# Patient Record
Sex: FEMALE | Race: AMERICAN INDIAN OR ALASKA NATIVE
[De-identification: names, ages, dates, MRNs, and addresses within clinical notes are randomized per-mention and may not be internally consistent; named-entity substitution may affect disease eponyms.]

---

## 2017-03-17 ENCOUNTER — HOSPITAL ENCOUNTER (INPATIENT)
Dept: HOSPITAL 14 - H.OPSURG | Age: 57
LOS: 3 days | Discharge: NURSING, REHAB OR LONG-TERM CARE | DRG: 465 | End: 2017-03-20
Attending: FAMILY MEDICINE | Admitting: FAMILY MEDICINE
Payer: MEDICARE

## 2017-03-17 VITALS — BODY MASS INDEX: 27.1 KG/M2

## 2017-03-17 DIAGNOSIS — I10: ICD-10-CM

## 2017-03-17 DIAGNOSIS — F17.210: ICD-10-CM

## 2017-03-17 DIAGNOSIS — M19.072: Primary | ICD-10-CM

## 2017-03-17 DIAGNOSIS — M21.42: ICD-10-CM

## 2017-03-17 DIAGNOSIS — Z23: ICD-10-CM

## 2017-03-17 DIAGNOSIS — L98.9: ICD-10-CM

## 2017-03-17 DIAGNOSIS — E11.65: ICD-10-CM

## 2017-03-17 DIAGNOSIS — Z79.4: ICD-10-CM

## 2017-03-17 PROCEDURE — 0HBNXZZ EXCISION OF LEFT FOOT SKIN, EXTERNAL APPROACH: ICD-10-PCS

## 2017-03-17 PROCEDURE — 0SGG04Z FUSION OF LEFT ANKLE JOINT WITH INTERNAL FIXATION DEVICE, OPEN APPROACH: ICD-10-PCS

## 2017-03-17 PROCEDURE — 3E0234Z INTRODUCTION OF SERUM, TOXOID AND VACCINE INTO MUSCLE, PERCUTANEOUS APPROACH: ICD-10-PCS | Performed by: FAMILY MEDICINE

## 2017-03-17 RX ADMIN — ENOXAPARIN SODIUM SCH MG: 40 INJECTION SUBCUTANEOUS at 18:10

## 2017-03-17 RX ADMIN — OXYCODONE HYDROCHLORIDE AND ACETAMINOPHEN PRN TAB: 5; 325 TABLET ORAL at 20:14

## 2017-03-17 NOTE — CP.PCM.PN
Subjective





- Date & Time of Evaluation


Date of Evaluation: 03/17/17


Time of Evaluation: 06:00





- Subjective


Subjective: 


56 year old female patient with PMHx of DM and HTN was seen at bedside SDS this 

morning for scheduled surgery of Left foot Talonavicular joint arthrodesis with 

Dr. Hector Bradley DPM. Patient states that she had pain to medial arch of the 

bilateral feet for more than 5 years, Left more than Right. Patient states that 

she does not remember experiencing any trauma to bilateral feet that might have 

caused the pain. She states that she can only walk very slow because of the 

pain which gets worse as she walks. Patient states that she has tried 

conservational treatments including orthotics and injections and now wishes for 

surgical intervention. Patient confirms NPO status since midnight yesterday and 

agrees with the surgical plan for fusion the TN joint of Left foot today with 

Dr. Bradley





Objective





- Vital Signs/Intake and Output


Vital Signs (last 24 hours): 


 











Temp Pulse Resp BP Pulse Ox


 


 98.4 F   85   18   119/75    


 


 03/07/17 13:56  03/07/17 13:56  03/07/17 13:56  03/07/17 13:56   











- Constitutional


Appears: Well, Non-toxic, No Acute Distress





- Extremities Exam


Additional comments: 


Bilateral lower extremities exam





DERM: No open wound is noted. No erythema is noted to bilateral feet. No sign 

of acute infection is noted.





VASC: Palpable DP and PT noted bilaterally 1/4, CRT less than 3 seconds noted 

to all digits bilaterally.





NEURO: Protective sensation intact





ORTHO including biomechanics exam:





Biomechanics evaluation for AlexanderMichael Glaser





S:


56 year old female patient presents with painful Left mid foot mostly at the 

level of Talonavicular joint and ankle for more than 5 years. Patient have 

tried conservative treatments including orthotic inserts which have failed to 

alleviate the persisting pain to left foot and ankle. The pain to left ankle is 

now worse than it has been in the past, limiting her walking ability to no more 

than 5 minutes.





O:


Pain on palpation to mid foot is noted with pain exacerbated upon weightbearing 

to Left foot. Limited ROM to Left ankle is noted with less than 15' in DF, 

accompanied by slight pain.


Arch collapse is noted bilaterally upon weightbearing. RCSP 8 degrees everted 

to Left, 8 degrees to Right. No structural or functional Limb length 

discrepancy is noted upon examination. STJ ROM is adaquate with MPJ inverted to 

the rearfoot. Anterior cavus foot type is not present to Left foot.


Hypermobile 1st ray is noted to Left foot.





Gait evaluation reveals apropulsive gait with weak plantarflexion before swing 

phase of gait cycle. Excessive pronation is noted throughout midstance and 

propulsion, with the knees flexed throughout the gait cycles. Increased angle 

of gait from the normal value of 10' as well as increased base of gait. 

Enhanced arch collapse on forefoot loading is noted; Left more than right. 

Slight antalgic gait with shortened stance phase is noted to Left lower 

extremity.





A:


Painful Left foot TN joint arthritis is present. Functional pes planus without 

pain is also present to Left foot with arch collapse upon weightbearing.~





P:


Possible conservative measures were discussed with the patient including ankle 

brace, orthotics and injections, which according to the patient, have failed to 

provide adequate pain relief to Left foot. Patient is present today for 

Surgical intervention for Left talonavicular joint fusion to eliminate pain 

upon ROM secondary to arthritis by Dr. Bradley








- Neurological Exam


Neurological Exam: Alert, Awake, Oriented x3





- Psychiatric Exam


Psychiatric exam: Normal Affect, Normal Mood





- Skin


Skin Exam: Normal Color





Assessment and Plan





- Assessment and Plan (Free Text)


Assessment: 


56 year old female patient presents with arthritis to Left Talonavicular joint


Plan: 


Pt was seen and examined in SDS


Pt NPO status was confirmed


All Pre-op testing and clearance was in the chart


Pt has exhausted all conservative treatment at this time and is opting for 

surgical intervention


Pt was explained procedure and post-operative course


All pt's questions were answered to satisfaction


No guarantees were made


Pt understands all risks, benefits and complications of procedure


Pt will follow-up with Dr. Bradley

## 2017-03-17 NOTE — CP.PCM.HP
History of Present Illness





- History of Present Illness


History of Present Illness: 


56 year old female with a history of DM II and HTN. Status post Left foot TNJ 

arthrodesis and excisional removal of skin lesion. Post-Op Day#0. Complains of 

mild pain and surgical site and mild headache. No chest pain shortness of breath

, palpitations, cough, sputum. No changes in vision. No new focal deficits. 

Afebrile. 





Present on Admission





- Present on Admission


Any Indicators Present on Admission: No





Review of Systems





- Review of Systems


Review of Systems: 


As per HPI.





Past Patient History





- Past Medical History & Family History


Past Medical History?: Yes





- Past Social History


Smoking Status: Heavy Smoker > 10 Cigarettes Daily





- CARDIAC


Hx Cardiac Disorders: Yes


Hx Hypercholesterolemia: Yes


Hx Hypertension: Yes





- PULMONARY


Hx Respiratory Disorders: Yes


Hx Asthma: Yes





- NEUROLOGICAL


Hx Neurological Disorder: No





- HEENT


Hx HEENT Problems: No





- RENAL


Hx Chronic Kidney Disease: No





- ENDOCRINE/METABOLIC


Hx Endocrine Disorders: Yes


Hx Diabetes Mellitus Type 1: Yes


Hx Diabetes Mellitus Type 2: Yes


Other/Comment: thyroid nodulesGOITER





- HEMATOLOGICAL/ONCOLOGICAL


Hx Blood Disorders: No


Hx Blood Transfusions: No





- INTEGUMENTARY


Hx Dermatological Problems: No





- MUSCULOSKELETAL/RHEUMATOLOGICAL


Hx Musculoskeletal Disorders: Yes


Hx Arthritis: Yes (KNEES)


Hx Falls: No





- GASTROINTESTINAL


Hx Gastrointestinal Disorders: No





- GENITOURINARY/GYNECOLOGICAL


Hx Genitourinary Disorders: No





- PSYCHIATRIC


Hx Psychophysiologic Disorder: No





- SURGICAL HISTORY


Hx Surgeries: Yes


Hx Hysterectomy: Yes (PARTIAL)


Hx Musculoskeletal Surgery: Yes (RIGHT CARPAL TUNNEL RELEASE 11/05/14 & STEROID 

INJ. RIGHT THUMB&LONG FINGER)


Hx Orthopedic Surgery: Yes (R shoulder rotator cuff, R foot sx)


Other/Comment: thyroid bx; LEFT BREAST BX.;





- ANESTHESIA


Hx Anesthesia: Yes


Hx Anesthesia Reactions: No


Hx Malignant Hyperthermia: No


Has any member of the family had a problem w/ anesthesia?: No





Meds


Allergies/Adverse Reactions: 


 Allergies











Allergy/AdvReac Type Severity Reaction Status Date / Time


 


No Known Allergies Allergy   Verified 03/17/17 06:19














Physical Exam





- Head Exam


Head Exam: ATRAUMATIC, NORMOCEPHALIC





- Eye Exam


Eye Exam: Normal appearance





- ENT Exam


ENT Exam: Mucous Membranes Dry





- Respiratory Exam


Respiratory Exam: NORMAL BREATHING PATTERN.  absent: Rales, Rhonchi, Wheezes





- Cardiovascular Exam


Cardiovascular Exam: REGULAR RHYTHM, +S1, +S2





- GI/Abdominal Exam


GI & Abdominal Exam: Soft.  absent: Distended, Tenderness





- Extremities Exam


Extremities exam: Negative for: pedal edema


Additional comments: 


Left LE CDI


Ice pack on foot





- Neurological Exam


Neurological exam: Alert, CN II-XII Intact, Oriented x3





Results





- Vital Signs


Recent Vital Signs: 





 Last Vital Signs











Temp  98.7 F   03/17/17 15:59


 


Pulse  108 H  03/17/17 15:59


 


Resp  20   03/17/17 15:59


 


BP  158/75 H  03/17/17 15:59


 


Pulse Ox  96   03/17/17 15:59














- Labs


Labs: 





 Laboratory Results - last 24 hr











  03/17/17 03/17/17





  06:46 12:07


 


POC Glucose (mg/dL)  191 H  167 H














Assessment & Plan





- Assessment and Plan (Free Text)


Assessment: 


56 year old female with a history of HTN and DM II. Status post Left foot TNJ 

arthrodesis and excisional removal of skin lesion.


Plan: 


Home medications verified via [facility], [place], [place]





1. Left foot TNJ arthrodesis and excisional removal of skin lesion


POD#0


a. Podiatry on board


b. Management of pain





2. HTN


a. Lisinopril 10mg PO Daily





3. DM II, uncontrolled, Insulin dependent


a. SHO doe AccuChecks 


c. Hypoglycemia protocol





4. DVT Px


Lovenox 40mg SC

## 2017-03-17 NOTE — RAD
PROCEDURE:  Intraoperative fluoroscopy



HISTORY:

LEFT FOOT



COMPARISON:

Not of a



TECHNIQUE:

Intraoperative fluoroscopy was provided. Total time of fluoroscopy is 

33.1 seconds.



FINDINGS:

Thirteen fluoroscopic spot films are submitted demonstrating 

insertion of hardware fixing the anterior talus and navicular. Films 

are on file for review. 



IMPRESSION:

Fluoroscopy provided.

## 2017-03-17 NOTE — CP.SDSHP
Same Day Surgery H & P





- History


Proposed Procedure: Left foot TNJ arthrodesis and excisional removel of skin 

lesion


Pre-Op Diagnosis: Left foot painful osteoarthritis of Talonavicular joint with 

painful skin lesion





- Previous Medical/Surgical History


Pain: 4.Moderate Pain


Previous Surgical History: Right foot Bunionectoy





- Allergies


Allergies: 


Allergies





No Known Allergies Allergy (Verified 03/17/17 06:19)


 











- Physical Exam


Mental Status: Alert & Oriented x3


Neuro: WNL


Heart: WNL


Lungs: WNL


GI: WNL





- {Optional Preform as Required}


Breast: Other


Abdomen: Other


Rectal: Other


Integument: Other


GYN: Other


: Other


Ortho: Other


ENT: Other





- Impression


Impression: Pt was seen and examined in SDS.  Pt NPO status was confirmed.  All 

Pre-op testing and clearance was in the chart.  Pt has exhausted all 

conservative treatment at this time and is opting for surgical intervention.  

Pt was explained procedure and post-operative course.  All pt's questions were 

answered to satisfaction.  No guarantees were made.  Pt understands all risks, 

benefits and complications of procedure.  Pt will follow-up with Dr. Bradley


Pt. Evaluated Today:Candidate for Anesthesia & Procedure: Yes





- Date & Time


Date: 03/17/17





Short Stay Discharge





- Short Stay Discharge


Admitting Diagnosis/Reason for Visit: TALONAVICULAR ARTHRODESIS LT FOOT


Disposition: TRANS TO OBS


Referrals: 


Mar Marshall MD [Primary Care Provider] - 


Follow-up: 


Follow up with Dr. Bradley at his office as outpatient


Instructions:  RICE Therapy (GEN), Acetaminophen/Codeine (By mouth), Cephalexin 

(By mouth)


Additional Instructions (Diet, Activity): 


Non-weightbearing to Left foot


Progress Note/Discharge Note with Instructions: 


Patient was evaluated post operatively and the patient will be admitted for 24 

observational watch.


Patient will be seen by Dr. Hector Bradley DPM tomorrow morning.

## 2017-03-17 NOTE — OP
PROCEDURE DATE: 03/17/2017



SURGEON:  Hector Bradley DPM



ASSISTANT:  Jeferson Lawton DPM, PGY-3



ANESTHETIST:  Dr. Tia MD



PREOPERATIVE DIAGNOSES:

1.  Left Foot - Painful osteoarthritis of the talonavicular joint.

2.  Left Foot - Painful skin lesion of unknown etiology.



POSTOPERATIVE DIAGNOSES:

1.  Left Foot - Painful osteoarthritis of the talonavicular joint.

2.  Left Foot - Painful skin lesion of unknown etiology.



NAME OF PROCEDURES:

1.  Left Foot - Talonavicular joint arthrodesis.

2.  Left Foot - Excision of soft tissue mass of unknown etiology.



INDICATIONS:  This is a 56-year-old female with the aforementioned diagnoses.  
The patient at this time has exhausted all of her conservative treatment 
options and she now opts and requests for surgical intervention.  The patient 
signed the consent form after careful explanation of all the risks, benefits, 
complications, and alternatives to the surgical procedure.  There were no 
guarantees that were made, given, nor implied.



PREPARATION:  The patient was brought into the operating room, placed on the 
operating table in a supine position.  A well-padded pneumatic ankle tourniquet 
was placed on the patient's left ankle in the supramalleolar position.  After 
induction of general anesthesia, the left foot and ankle were then prepped and 
draped in the usual sterile manner.  A timeout was performed.  An Esmarch 
bandage was utilized to exsanguinate the left foot and ankle.  The pneumatic 
ankle tourniquet was then inflated to 250 mmHg and then the procedure began.



PROCEDURE #1:  

Attention directed to the dorsal medial aspect of the patient's left foot, 
where an approximately 5 cm curvilinear incision was made overlying the medial 
aspect of the talonavicular joint.  The incision extended from approximately 
the tip of the medial malleolus to the navicular-medial cuneiform joint.  The 
incision was deepened through the subcutaneous tissues utilizing a combination 
of sharp and blunt dissection.  Care was taken to identify and retract all 
vital neurovascular structures and cauterize all bleeders as necessary, 
including, but not limited to, the great saphenous vein and branches of the 
peroneal nerve.  The incision was deepened down to the level of the 
talonavicular joint capsule, which once encountered, a fresh #15 blade was 
utilized to make a linear capsulotomy in parallel with the skin incision, 
overlying the medial aspect of the talonavicular joint.  The periosteal and 
capsular structures were then carefully dissected free from their osseous 
attachments from the head of the talus and the base of the navicular.  Next, in 
order to gain adequate exposure to the articular surfaces of the talonavicular 
joint, a SenionLabermann distractor with two 0.062 inch K-wires was utilized to 
spread open and distract the talonavicular joint.  After that was accomplished, 
a small laminar  without sharp teeth was inserted into the 
talonavicular joint space as well and utilized to further distract the joint.  
Next, with the use of curettes and curved osteotomes, the articular surfaces 
and subchondral bone of the head of the talus and the base/posterior aspect of 
the navicular were resected in their entirety.  All articular surfaces of the 
talonavicular joint were resected and removed from the surgical field.  Next, 
the surgical site was then irrigated with a copious amount of normal sterile 
saline solution and the surgical field was now inspected and any remaining 
articular cartilage was resected as necessary.  Next, utilizing a 1.5 mm drill 
bit, the subchondral plate of the head of the talus and the posterior aspect of 
the navicular were subchondrally drilled.  Multiple perforations of the 
subchondral plate occurred on both the head of the talus and the posterior 
aspect of the navicular.  After that was completed, the talonavicular joint was 
again irrigated with a copious amount of normal sterile saline.  Next, 
utilizing a K-wire from the Synthes 4.0 cannulated cancellous screw set, the 
talonavicular joint was temporarily fixated.  While fixating the talonavicular 
joint, special attention was paid to hold the rearfoot in neither varus nor 
valgus and to hold the forefoot in a neutral position.  After the talonavicular 
joint was temporarily fixated, adequate positioning of the talonavicular joint 
surfaces was then confirmed with the use of intraoperative fluoroscopy.  Next, 
an additional K-wire from the aforementioned screw set was now inserted further 
lateral and still parallel to the first K-wire.  Again, temporary fixation was 
confirmed with the use of intraoperative fluoroscopy and adjusted as deemed 
necessary in order to gain adequate bony apposition.  Next, following standard 
AO principles and techniques, a Synthes 4.0 mm x 30 mm cannulated cancellous 
screw was inserted from anterior to posterior across the resected joint surface 
with excellent compression noted.  The first K-wire was then removed and set 
aside.  Next, our attention was now directed to our more lateral K-wire where a 
Synthes 4.0 mm x 42 mm cannulated cancellous screw was then inserted again.  It 
is noted that during insertion of the lateral most screw across the 
talonavicular joint, the screw head became stripped and the screw could no 
longer be advanced nor withdrawn with the use of a screwdriver.  Next, the 
Synthes hardware removal set was now brought into the operative field and the 
42 mm cannulated screw was successfully extracted without any breaking or 
stripping of the screw noted with a reverse cutting screw extractor.  Next, the 
decision was made to now replace the 42 mm screw with a staple.  Next, 
utilizing the BME staple measuring device, two 18 mm BME staples were 
appropriately applied, while following standard BME staple insertion protocol, 
successfully across the talonavicular joint.  One staple was applied across the 
medial aspect of the joint and a second staple was applied across the dorsal 
aspect of the joint.  It is of note that prior to staple application on the 
medial side of the joint, a sagittal bone saw was brought into the operative 
field and the hypertrophied portion of the navicular tuberosity was resected 
and passed from the operative field in order to allow for more adequate 
positioning of the staple and to ensure that it would be flush to bone.  Next, 
after all hardware had been inserted and positioning had been confirmed with 
the use of intraoperative fluoroscopy, the surgical site was irrigated with a 
copious amount of normal sterile saline solution.  The periosteal and capsular 
structures were then reapproximated with #2-0 Vicryl suture.  The subcutaneous 
tissues were reapproximated with #3-0 Vicryl suture.  The subcuticular tissue 
was reapproximated with 4-0 Vicryl suture and then the skin was reapproximated 
with #3-0 nylon suture in an interrupted horizontal mattress fashion.



PROCEDURE #2.  

Next, our attention now directed to the plantar aspect of the patient's left 
foot, where an approximately 1 cm x 1 cm hard palpable lesion was noted at 
approximately the mid foot level of the patient's plantar foot.  Next, 
utilizing a #15 blade and pickups, an elliptical incision was now performed 
surrounding the lesion of unknown etiology.  The elliptical incision was 
approximately 3 cm in length and 1 cm in diameter.  After the lesion was 
successfully excised, it was then passed off the operative field and sent as a 
pathologic specimen.  Next, the plantar surgical site was now irrigated with a 
copious amount of normal sterile saline solution.  The surgical site was then 
inspected for any residual aspects of the lesion and none were noted.  Next, 
utilizing #3-0 nylon suture and in a simple interrupted fashion, the skin on 
the plantar aspect of the foot was now reapproximated.



Next, the patient received a postoperative injection consisting of 20 mL of 0.5
% Marcaine plain in the form of local field block infiltration to the surgical 
areas.  The patient further received an injection of 1 mL of Decadron, again to 
all surgical areas.  Next, the foot was then cleansed and dried and Xeroform 
was then applied across the surgical sites.  The surgical sites were now 
dressed with dry sterile dressings.  The pneumatic ankle tourniquet was 
deflated and removed and the foot then continued to be dressed with Coban and 
an additional layer of dry sterile dressings, followed by a final layer of 
Coban.



POSTOPERATIVE CONDITION:  The patient tolerated the anesthesia and the 
procedure well and was escorted to the recovery room with her vital signs 
stable and neurovascular status intact to the left foot as noted by good 
cutaneous hyperemia to all 5 digits of the left foot.  The patient will be 
nonweightbearing to the left lower extremity with the aid of a standard walker.
  The patient will be admitted to the hospital for 23-hour observations and 
will likely be discharged tomorrow.  Upon discharge, the patient will follow up 
with Dr. Bradley in his office next week.





__________________________________________

Jeferson Lawton DPM



__________________________________________

Hector Bradley DPM







cc:   



DD: 03/17/2017 13:38:21  [id number]

TT: 03/17/2017 14:42:29

Job # [id number]

en

MTDD

## 2017-03-17 NOTE — PCM.SURG1
Surgeon's Initial Post Op Note





- Surgeon's Notes


Surgeon: Dr. Bradley


Assistant: Dr. Lawton


Type of Anesthesia: General Endo, Local


Anesthesia Administered By: Dr. Melton


Pre-Operative Diagnosis: Left foot painful osteoarthritis of Talonavicular 

joint with painful skin lesion


Operative Findings: Materials:  * 2-0, 3-0, 4-0 Vicryl.  * synthes  4.0 x 30mm 

cannulated screw.  * BME 18mm staples x2


Post-Operative Diagnosis: same


Operation Performed: Left foot TNJ arthrodesis and excisional removel of skin 

lesion


Specimen/Specimens Removed: None


Estimated Blood Loss: EBL {In ML}: 20


Blood Products Given: N/A


Drains Used: No Drains


Post-Op Condition: Good


Date of Surgery/Procedure: 03/17/17


Time of Surgery/Procedure: 08:00

## 2017-03-17 NOTE — RAD
PROCEDURE:  Left Foot Radiographs.



HISTORY:

left foot surgery  



COMPARISON:

None.



FINDINGS:



BONES:

Status post surgical fixation at talonavicular articulation. Surgical 

hardware appears intact. No acute fracture. Plantar calcaneal spur 

noted. Likely status post osteotomy distal aspect 5th proximal 

phalanx. This is unchanged from prior radiograph. 



JOINTS:

Normal. 



SOFT TISSUES:

Normal. 



OTHER FINDINGS:

None.



IMPRESSION:

Surgical fixation at talonavicular articulation.  Old osteotomy 5th 

proximal phalanx.

## 2017-03-18 RX ADMIN — OXYCODONE HYDROCHLORIDE AND ACETAMINOPHEN PRN TAB: 5; 325 TABLET ORAL at 23:55

## 2017-03-18 RX ADMIN — OXYCODONE HYDROCHLORIDE AND ACETAMINOPHEN PRN TAB: 5; 325 TABLET ORAL at 08:52

## 2017-03-18 RX ADMIN — OXYCODONE HYDROCHLORIDE AND ACETAMINOPHEN PRN TAB: 5; 325 TABLET ORAL at 16:13

## 2017-03-18 RX ADMIN — ENOXAPARIN SODIUM SCH MG: 40 INJECTION SUBCUTANEOUS at 08:51

## 2017-03-18 RX ADMIN — INSULIN LISPRO SCH UNITS: 100 INJECTION, SUSPENSION SUBCUTANEOUS at 16:30

## 2017-03-18 NOTE — CP.PCM.PN
Subjective





- Date & Time of Evaluation


Date of Evaluation: 03/18/17


Time of Evaluation: 08:40





- Subjective


Subjective: 


POD#1


Improved ambulation efforts, though remains limited.


Pain controlled on PRN analgesia.


Tolerating PO intake. Afebrile. No nausea, vomiting, diarrhea. 


No shortness of breath, chest pain, palpitations. 





Objective





- Vital Signs/Intake and Output


Vital Signs (last 24 hours): 


 











Temp Pulse Resp BP Pulse Ox


 


 98.5 F   101 H  20   166/80 H  95 


 


 03/18/17 08:44  03/18/17 08:48  03/18/17 08:44  03/18/17 08:48  03/18/17 08:44











- Medications


Medications: 


 Current Medications





Acetaminophen (Tylenol 325mg Tab)  650 mg PO Q4 PRN


   PRN Reason: Pain, Mild (1-3)


   Last Admin: 03/17/17 16:37 Dose:  650 mg


Aspirin (Aspirin Chewable)  81 mg PO DAILY [facility]


   Last Admin: 03/18/17 08:47 Dose:  81 mg


Dextrose (Glutose 15)  0 gm PO ONCE PRN; Protocol


   PRN Reason: Hypoglycemia Protocol


Dextrose (Dextrose 50% Inj)  0 ml IV STAT PRN; Protocol


   PRN Reason: Hyglycemia Protocol


Enoxaparin Sodium (Lovenox)  40 mg SC DAILY [facility]


   PRN Reason: Protocol


   Last Admin: 03/18/17 08:51 Dose:  40 mg


Glucagon (Glucagen Diagnostic Kit)  0 mg IM STAT PRN; Protocol


   PRN Reason: Hypoglycemia Protocol


Insulin Human Regular (Humulin R)  0 units SC ACCU-CHECK [facility]


   PRN Reason: Protocol


   Last Admin: 03/18/17 12:02 Dose:  3 units


Insulin Lispro Protam/Lispro Human (Humalog Mix 75/25)  15 units SC BID [facility]


Lisinopril (Zestril)  10 mg PO DAILY [facility]


   Last Admin: 03/18/17 08:48 Dose:  10 mg


Oxycodone/Acetaminophen (Percocet 5/325 Mg Tab)  1 tab PO Q4 PRN


   PRN Reason: Pain, moderate (4-7)


   Stop: 03/20/17 12:11


Oxycodone/Acetaminophen (Percocet 5/325 Mg Tab)  2 tab PO Q4 PRN


   PRN Reason: Pain, severe (8-10)


   Stop: 03/20/17 12:11


   Last Admin: 03/18/17 08:52 Dose:  2 tab











- Head Exam


Head Exam: ATRAUMATIC, NORMOCEPHALIC





- Eye Exam


Eye Exam: EOMI, Normal appearance





- Respiratory Exam


Respiratory Exam: absent: Rales, Rhonchi, Wheezes





- Cardiovascular Exam


Cardiovascular Exam: REGULAR RHYTHM, +S1, +S2





- GI/Abdominal Exam


GI & Abdominal Exam: Soft.  absent: Tenderness





- Extremities Exam


Additional comments: 


Right LE 


CDI


Motorsensorium grossly WNL





- Neurological Exam


Neurological Exam: Alert, Awake





Assessment and Plan





- Assessment and Plan (Free Text)


Plan: 


1. Left foot TNJ arthrodesis and excisional removal of skin lesion


POD#1


a. Podiatry on board


b. Management of pain


c. TCU recommended by PT


d. Continue PT





2. HTN


a. Lisinopril 10mg PO Daily





3. DM II, uncontrolled, Insulin dependent


a. SHO doe AccuChecks 


c. Hypoglycemia protocol


d. Maura Insulin added 75/25 15u BID





4. DVT Px


Lovenox 40mg SC

## 2017-03-18 NOTE — CP.PCM.PN
Subjective





- Date & Time of Evaluation


Date of Evaluation: 03/18/17


Time of Evaluation: 09:00





- Subjective


Subjective: 


56 year old female patient with PMHx of DM and HTN, is POD#1 for left foot Talo-

navicular joint arthrodesis. Pt was resting comfortably upon arrival. Evaluatd 

with attending, Dr. Hector Bradley DPM present. Pt reports ost-operative pain is 

well controlled with pain medicines and reports no pain or breakthrough pain at 

this time. Pt is tolerating diet, and voiding freely. Pt denies any acute 

overnight events, denies recent n/v/f/c/cp/sob. 





Objective





- Vital Signs/Intake and Output


Vital Signs (last 24 hours): 


 











Temp Pulse Resp BP Pulse Ox


 


 98.5 F   101 H  20   166/80 H  95 


 


 03/18/17 08:44  03/18/17 08:48  03/18/17 08:44  03/18/17 08:48  03/18/17 08:44











- Medications


Medications: 


 Current Medications





Acetaminophen (Tylenol 325mg Tab)  650 mg PO Q4 PRN


   PRN Reason: Pain, Mild (1-3)


   Last Admin: 03/17/17 16:37 Dose:  650 mg


Aspirin (Aspirin Chewable)  81 mg PO DAILY [facility]


   Last Admin: 03/18/17 08:47 Dose:  81 mg


Dextrose (Glutose 15)  0 gm PO ONCE PRN; Protocol


   PRN Reason: Hypoglycemia Protocol


Dextrose (Dextrose 50% Inj)  0 ml IV STAT PRN; Protocol


   PRN Reason: Hyglycemia Protocol


Enoxaparin Sodium (Lovenox)  40 mg SC DAILY [facility]


   PRN Reason: Protocol


   Last Admin: 03/18/17 08:51 Dose:  40 mg


Glucagon (Glucagen Diagnostic Kit)  0 mg IM STAT PRN; Protocol


   PRN Reason: Hypoglycemia Protocol


Insulin Human Regular (Humulin R)  0 units SC ACCU-CHECK [facility]


   PRN Reason: Protocol


   Last Admin: 03/18/17 07:08 Dose:  Not Given


Lisinopril (Zestril)  10 mg PO DAILY [facility]


   Last Admin: 03/18/17 08:48 Dose:  10 mg


Oxycodone/Acetaminophen (Percocet 5/325 Mg Tab)  1 tab PO Q4 PRN


   PRN Reason: Pain, moderate (4-7)


   Stop: 03/20/17 12:11


   Last Admin: 03/18/17 08:52 Dose:  1 tab


Oxycodone/Acetaminophen (Percocet 5/325 Mg Tab)  2 tab PO Q4 PRN


   PRN Reason: Pain, severe (8-10)


   Stop: 03/20/17 12:11


   Last Admin: 03/17/17 20:14 Dose:  2 tab











- Constitutional


Appears: Well, Non-toxic, No Acute Distress





- Extremities Exam


Additional comments: 


Left lower extremity focused. Dressings clean, dry, and intact, absent 

strikethrough.





Neuro-vascular status intact to level of digits. Pt able to flex and extend at 

knee joint freely.  





- Neurological Exam


Neurological Exam: Alert, Awake, Oriented x3





- Psychiatric Exam


Psychiatric exam: Normal Affect, Normal Mood





Assessment and Plan





- Assessment and Plan (Free Text)


Assessment: 


56 year old female is 1 day s/p left foot talo-navicular arthrodesis. 


Plan: 


Pt evaluated and treated with attending, Dr. Bradley present. Chart, labs, and 

vitals reviewed. 


-Pt to start physical therapy, discussed with physical therapist pt's limited 

ability to complete physical therapy goals in a timely manner. Due to patient's 

living alone and, need for additional physical therapy, TCU is recommended for 

patient. 


-Patient to remain non-weightbearing to left lower extremity. PT to continue to 

work with patient to improve mobility.  


-Pt to started on post-operative 7 day prophylactic course of Keflex 500 TID. 





Podiatry will continue to follow patient while inhouse.

## 2017-03-18 NOTE — CP.PCM.PN
Subjective





- Date & Time of Evaluation


Date of Evaluation: 03/18/17


Time of Evaluation: 09:00





- Subjective


Subjective: 


  Pt.seen s/p t-n fusion left . Difficulty wiyh NWB staus and PT states goals 

not met for safely 


 discharging .Pt lives alone with no help . TCU placement recommended. 





Objective





- Vital Signs/Intake and Output


Vital Signs (last 24 hours): 


 











Temp Pulse Resp BP Pulse Ox


 


 98.2 F   90   20   116/73   96 


 


 03/18/17 20:10  03/18/17 20:10  03/18/17 20:10  03/18/17 20:10  03/18/17 20:10











- Medications


Medications: 


 Current Medications





Acetaminophen (Tylenol 325mg Tab)  650 mg PO Q4 PRN


   PRN Reason: Pain, Mild (1-3)


   Last Admin: 03/17/17 16:37 Dose:  650 mg


Aspirin (Aspirin Chewable)  81 mg PO DAILY [facility]


   Last Admin: 03/18/17 08:47 Dose:  81 mg


Cephalexin Monohydrate (Keflex)  500 mg PO TID [facility]


   Stop: 03/25/17 17:01


   Last Admin: 03/18/17 16:29 Dose:  500 mg


Dextrose (Glutose 15)  0 gm PO ONCE PRN; Protocol


   PRN Reason: Hypoglycemia Protocol


Dextrose (Dextrose 50% Inj)  0 ml IV STAT PRN; Protocol


   PRN Reason: Hyglycemia Protocol


Enoxaparin Sodium (Lovenox)  40 mg SC DAILY [facility]


   PRN Reason: Protocol


   Last Admin: 03/18/17 08:51 Dose:  40 mg


Glucagon (Glucagen Diagnostic Kit)  0 mg IM STAT PRN; Protocol


   PRN Reason: Hypoglycemia Protocol


Insulin Human Regular (Humulin R)  0 units SC ACCU-CHECK [facility]


   PRN Reason: Protocol


   Last Admin: 03/18/17 22:28 Dose:  Not Given


Insulin Lispro Protam/Lispro Human (Humalog Mix 75/25)  15 units SC BID [facility]


   Last Admin: 03/18/17 16:30 Dose:  15 units


Lisinopril (Zestril)  10 mg PO DAILY [facility]


   Last Admin: 03/18/17 08:48 Dose:  10 mg


Oxycodone/Acetaminophen (Percocet 5/325 Mg Tab)  1 tab PO Q4 PRN


   PRN Reason: Pain, moderate (4-7)


   Stop: 03/20/17 12:11


Oxycodone/Acetaminophen (Percocet 5/325 Mg Tab)  2 tab PO Q4 PRN


   PRN Reason: Pain, severe (8-10)


   Stop: 03/20/17 12:11


   Last Admin: 03/18/17 16:13 Dose:  2 tab

## 2017-03-19 RX ADMIN — INSULIN LISPRO SCH UNITS: 100 INJECTION, SUSPENSION SUBCUTANEOUS at 08:13

## 2017-03-19 RX ADMIN — INSULIN LISPRO SCH UNITS: 100 INJECTION, SUSPENSION SUBCUTANEOUS at 16:01

## 2017-03-19 RX ADMIN — INSULIN LISPRO SCH: 100 INJECTION, SUSPENSION SUBCUTANEOUS at 08:21

## 2017-03-19 RX ADMIN — ENOXAPARIN SODIUM SCH MG: 40 INJECTION SUBCUTANEOUS at 08:12

## 2017-03-19 RX ADMIN — OXYCODONE HYDROCHLORIDE AND ACETAMINOPHEN PRN TAB: 5; 325 TABLET ORAL at 08:10

## 2017-03-19 RX ADMIN — OXYCODONE HYDROCHLORIDE AND ACETAMINOPHEN PRN TAB: 5; 325 TABLET ORAL at 20:20

## 2017-03-19 NOTE — CP.PCM.PN
Subjective





- Date & Time of Evaluation


Date of Evaluation: 03/19/17


Time of Evaluation: 07:45





- Subjective


Subjective: 


POD #2


- PT was seen resting in bed with left foot elevated about to eat breakfast. 

She states she is able to transfer to the chair. Tolerating PO intake. Denies N/

V/D. No SOB, chest pain palpitations.





-As per PT will need to transfer to TCU





Objective





- Vital Signs/Intake and Output


Vital Signs (last 24 hours): 


 











Temp Pulse Resp BP Pulse Ox


 


 99.0 F   102 H  20   95/54 L  98 


 


 03/19/17 08:17  03/19/17 08:17  03/19/17 08:17  03/19/17 08:17  03/19/17 08:17











- Medications


Medications: 


 Current Medications





Acetaminophen (Tylenol 325mg Tab)  650 mg PO Q4 PRN


   PRN Reason: Pain, Mild (1-3)


   Last Admin: 03/17/17 16:37 Dose:  650 mg


Aspirin (Aspirin Chewable)  81 mg PO DAILY [facility]


   Last Admin: 03/19/17 08:11 Dose:  81 mg


Cephalexin Monohydrate (Keflex)  500 mg PO TID [facility]


   Stop: 03/25/17 17:01


   Last Admin: 03/19/17 08:11 Dose:  500 mg


Dextrose (Glutose 15)  0 gm PO ONCE PRN; Protocol


   PRN Reason: Hypoglycemia Protocol


Dextrose (Dextrose 50% Inj)  0 ml IV STAT PRN; Protocol


   PRN Reason: Hyglycemia Protocol


Enoxaparin Sodium (Lovenox)  40 mg SC DAILY [facility]


   PRN Reason: Protocol


   Last Admin: 03/19/17 08:12 Dose:  40 mg


Glucagon (Glucagen Diagnostic Kit)  0 mg IM STAT PRN; Protocol


   PRN Reason: Hypoglycemia Protocol


Insulin Human Regular (Humulin R)  0 units SC ACCU-CHECK [facility]


   PRN Reason: Protocol


   Last Admin: 03/19/17 07:09 Dose:  Not Given


Insulin Lispro Protam/Lispro Human (Humalog Mix 75/25)  15 units SC BID [facility]


   Last Admin: 03/19/17 08:21 Dose:  Not Given


Lisinopril (Zestril)  10 mg PO DAILY [facility]


   Last Admin: 03/19/17 08:11 Dose:  10 mg


Oxycodone/Acetaminophen (Percocet 5/325 Mg Tab)  1 tab PO Q4 PRN


   PRN Reason: Pain, moderate (4-7)


   Stop: 03/20/17 12:11


Oxycodone/Acetaminophen (Percocet 5/325 Mg Tab)  2 tab PO Q4 PRN


   PRN Reason: Pain, severe (8-10)


   Stop: 03/20/17 12:11


   Last Admin: 03/19/17 08:10 Dose:  2 tab











- Constitutional


Appears: No Acute Distress





- Head Exam


Head Exam: NORMAL INSPECTION





- Eye Exam


Eye Exam: Normal appearance


Pupil Exam: NORMAL ACCOMODATION





- ENT Exam


ENT Exam: Mucous Membranes Moist





- Neck Exam


Neck Exam: Full ROM, Normal Inspection





- Respiratory Exam


Respiratory Exam: Clear to Ausculation Bilateral, NORMAL BREATHING PATTERN





- Cardiovascular Exam


Cardiovascular Exam: REGULAR RHYTHM, +S1, +S2





- GI/Abdominal Exam


GI & Abdominal Exam: Soft, Normal Bowel Sounds





- Extremities Exam


Additional comments: 


Left foot bandage c/d/i . Tenderness on palpation.





- Neurological Exam


Neurological Exam: Alert, Awake, Normal Gait





Assessment and Plan





- Assessment and Plan (Free Text)


Assessment: 


1. Left foot TNJ arthrodesis and excisional removal of skin lesion


POD#2


a. Podiatry on board


b. Management of pain


c. TCU recommended by PT


d. Keflex 500 mg PO TID 


e. Continue PT





2. HTN


a. Lisinopril 10mg PO Daily





3. DM II, uncontrolled, Insulin dependent


a. SHO doe AccuChecks 


c. Hypoglycemia protocol


d. Maura Insulin added 75/25 15u BID





4. DVT Px


Lovenox 40mg SC

## 2017-03-20 VITALS — RESPIRATION RATE: 18 BRPM | HEART RATE: 82 BPM | TEMPERATURE: 98.6 F

## 2017-03-20 VITALS — OXYGEN SATURATION: 98 %

## 2017-03-20 VITALS — DIASTOLIC BLOOD PRESSURE: 79 MMHG | SYSTOLIC BLOOD PRESSURE: 129 MMHG

## 2017-03-20 RX ADMIN — OXYCODONE HYDROCHLORIDE AND ACETAMINOPHEN PRN TAB: 5; 325 TABLET ORAL at 08:39

## 2017-03-20 RX ADMIN — INSULIN LISPRO SCH: 100 INJECTION, SUSPENSION SUBCUTANEOUS at 08:30

## 2017-03-20 RX ADMIN — ENOXAPARIN SODIUM SCH MG: 40 INJECTION SUBCUTANEOUS at 08:31

## 2017-03-20 NOTE — CP.PCM.DIS
Provider





- Provider


Date of Admission: 


03/17/17 10:28





Attending physician: 


Darlene Reno MD





Primary care physician: 


Mar Marshall MD





Consults: 


Dr. Bradley-Podiatry


Time Spent in preparation of Discharge (in minutes): 45





Hospital Course





- Lab Results


Lab Results: 


 Most Recent Lab Values











POC Glucose (mg/dL)  229 mg/dL ()  H  03/20/17  05:52    














- Hospital Course


Hospital Course: 


56 year old female with PMH of DM II, HTN was admitted to the hospital s/p left 

foot TNJ fusion and excisional removal of skin lesions on 3/17/17. Podiatry was 

consulted for post-operative management.  Patients pain was controlled with 

medication and dressing kept intact to left foot.  Patient participated in 

physical therapy.  She is discharged to a subacute rehab for further physical 

therapy.





Discharge Exam





- Head Exam


Head Exam: ATRAUMATIC





Discharge Plan





- Follow Up Plan


Condition: GOOD


Disposition: REHAB FACILITY/REHAB UNIT


Instructions:  Cephalexin (By mouth), Acetaminophen/Codeine (By mouth), RICE 

Therapy (GEN)


Additional Instructions: 


Patient to be Non-weight bearing to Left foot for 6 weeks


Patient to follow up with Dr. Bradley in office next week


Patient to take percocet Q4-6H prn pain (20 tabs dispensed)


Patient to take keflex 500 mg TID for 10 days








Patient to continue home medications


Aspirin 81 mg PO daily


Lisinopril 10 mg PO daily


Fluticasone/Vilanterol 1 mcg INH


Empagliflozin 25 mg PO daily


Simvastatin 40 mg PO daily


Novolog mix 70/30 15 units SC BID





Referrals: 


Mar Marshall MD [Primary Care Provider] - 


Hector Bradley DPM [Doctor Podiatric Medicine] -

## 2017-03-20 NOTE — CP.PCM.PN
Subjective





- Date & Time of Evaluation


Date of Evaluation: 03/20/17


Time of Evaluation: 12:39





- Subjective


Subjective: 


56 year old female was seen resting at bedside 3 days s/p left foot TNJ fusion.

  Patient admits to being able to transfer from her bed to her chair and to the 

portable toilet.  She admits to intermittent pain.  Denies n/v/f/c/sob/cp.  As 

per PT, patient will need to transfer to a [facility] or TCU.





Objective





- Vital Signs/Intake and Output


Vital Signs (last 24 hours): 


 











Temp Pulse Resp BP Pulse Ox


 


 98.6 F   82   18   129/79   98 


 


 03/20/17 08:20  03/20/17 08:30  03/20/17 08:20  03/20/17 08:30  03/20/17 08:20











- Medications


Medications: 


 Current Medications





Acetaminophen (Tylenol 325mg Tab)  650 mg PO Q4 PRN


   PRN Reason: Pain, Mild (1-3)


   Last Admin: 03/17/17 16:37 Dose:  650 mg


Aspirin (Aspirin Chewable)  81 mg PO DAILY [facility]


   Last Admin: 03/20/17 08:30 Dose:  81 mg


Cephalexin Monohydrate (Keflex)  500 mg PO TID [facility]


   Stop: 03/25/17 17:01


   Last Admin: 03/20/17 08:30 Dose:  500 mg


Dextrose (Glutose 15)  0 gm PO ONCE PRN; Protocol


   PRN Reason: Hypoglycemia Protocol


Dextrose (Dextrose 50% Inj)  0 ml IV STAT PRN; Protocol


   PRN Reason: Hyglycemia Protocol


Enoxaparin Sodium (Lovenox)  40 mg SC DAILY [facility]


   PRN Reason: Protocol


   Last Admin: 03/20/17 08:31 Dose:  40 mg


Glucagon (Glucagen Diagnostic Kit)  0 mg IM STAT PRN; Protocol


   PRN Reason: Hypoglycemia Protocol


Insulin Human Regular (Humulin R)  0 units SC ACCU-CHECK [facility]


   PRN Reason: Protocol


   Last Admin: 03/20/17 07:42 Dose:  3 units


Insulin Lispro Protam/Lispro Human (Humalog Mix 75/25)  15 units SC BID [facility]


   Last Admin: 03/20/17 08:30 Dose:  Not Given


Lisinopril (Zestril)  10 mg PO DAILY [facility]


   Last Admin: 03/20/17 08:30 Dose:  10 mg











- Constitutional


Appears: Well, Non-toxic, No Acute Distress





- Head Exam


Head Exam: ATRAUMATIC





- Eye Exam


Eye Exam: Normal appearance





- Neck Exam


Neck Exam: Full ROM





- Respiratory Exam


Respiratory Exam: NORMAL BREATHING PATTERN.  absent: Rales, Rhonchi, Wheezes





- Cardiovascular Exam


Cardiovascular Exam: REGULAR RHYTHM, +S1, +S2





- GI/Abdominal Exam


GI & Abdominal Exam: Soft, Normal Bowel Sounds.  absent: Tenderness





- Extremities Exam


Additional comments: 


dressing intact to left foot, mild tenderness on palpation to left foot





- Back Exam


Back Exam: NORMAL INSPECTION





- Neurological Exam


Neurological Exam: Alert, Awake, Oriented x3





- Psychiatric Exam


Psychiatric exam: Normal Affect, Normal Mood





- Skin


Skin Exam: Dry, Normal Color, Warm





Assessment and Plan





- Assessment and Plan (Free Text)


Assessment: 


56 year old female with PMH of HTN, DM II, 3 days s/p left foot TNJ fusions and 

excisional removal of skin lesions


Plan: 


1. 3 s/p left foot TNJ fusion and excisional removal of skin lesions


-Podiatry on consult


-Percocet 5/325 mg PRN pain


-Rehab recommended by PT


-Keflex 500 mg PO TID for 10 days


-Continue physical therapy





2. HTN


-Lisinopril 10mg PO daily





3. DM II, uncontrolled, insulin dependent


-SHO


-Naren


-hypoglycemia protocol


-Maura Insulin 75/25 15 u BID





4. DVT ppx


-Lovenox 40mg SC daily

## 2017-03-20 NOTE — CP.PCM.PN
Subjective





- Date & Time of Evaluation


Date of Evaluation: 03/20/17


Time of Evaluation: 13:00





- Subjective


Subjective: 


56 year old female patient with PMHx of DM and HTN, is POD#3 for left foot Talo-

navicular joint arthrodesis. Patient is resting comfortably in bed with no 

report of acute overnight distress. Dressing to Left lower extremity remains 

clean dry and intact. Patient complains of mild paint to Left foot. She is 

keeping her legs elevated in bed. Pt is tolerating diet, and voiding freely. 

Patient denies recent n/v/f/c/cp/sob. 





Objective





- Vital Signs/Intake and Output


Vital Signs (last 24 hours): 


 











Temp Pulse Resp BP Pulse Ox


 


 98.6 F   82   18   129/79   98 


 


 03/20/17 08:20  03/20/17 08:30  03/20/17 08:20  03/20/17 08:30  03/20/17 08:20











- Medications


Medications: 


 Current Medications





Acetaminophen (Tylenol 325mg Tab)  650 mg PO Q4 PRN


   PRN Reason: Pain, Mild (1-3)


   Last Admin: 03/17/17 16:37 Dose:  650 mg


Aspirin (Aspirin Chewable)  81 mg PO DAILY [facility]


   Last Admin: 03/20/17 08:30 Dose:  81 mg


Cephalexin Monohydrate (Keflex)  500 mg PO TID [facility]


   Stop: 03/25/17 17:01


   Last Admin: 03/20/17 13:12 Dose:  500 mg


Dextrose (Glutose 15)  0 gm PO ONCE PRN; Protocol


   PRN Reason: Hypoglycemia Protocol


Dextrose (Dextrose 50% Inj)  0 ml IV STAT PRN; Protocol


   PRN Reason: Hyglycemia Protocol


Enoxaparin Sodium (Lovenox)  40 mg SC DAILY [facility]


   PRN Reason: Protocol


   Last Admin: 03/20/17 08:31 Dose:  40 mg


Glucagon (Glucagen Diagnostic Kit)  0 mg IM STAT PRN; Protocol


   PRN Reason: Hypoglycemia Protocol


Insulin Human Regular (Humulin R)  0 units SC ACCU-CHECK [facility]


   PRN Reason: Protocol


   Last Admin: 03/20/17 13:09 Dose:  6 units


Insulin Lispro Protam/Lispro Human (Humalog Mix 75/25)  15 units SC BID [facility]


   Last Admin: 03/20/17 08:30 Dose:  Not Given


Lisinopril (Zestril)  10 mg PO DAILY [facility]


   Last Admin: 03/20/17 08:30 Dose:  10 mg











- Constitutional


Appears: Well, Non-toxic





- Extremities Exam


Additional comments: 


Left lower extremity focused. Dressings clean, dry, and intact, absent 

strikethrough.





Neuro-vascular status intact to level of digits. Pt able to flex and extend at 

knee joint freely.  








- Neurological Exam


Neurological Exam: Alert, Awake, Oriented x3





- Psychiatric Exam


Psychiatric exam: Normal Affect, Normal Mood





- Skin


Skin Exam: Normal Color, Warm





Assessment and Plan





- Assessment and Plan (Free Text)


Assessment: 


56 year old female is 3 days s/p left foot talo-navicular arthrodesis. 


Plan: 


Pt evaluated and treated with attending, Dr. Bradley


Chart, labs, and vitals reviewed. 


-Due to patient's living alone and, need for additional physical therapy, TCU 

is recommended for patient. 


-Patient to remain non-weightbearing to left lower extremity for at least 4 

weeks as per Dr. Bradley DPM.


-PT to continue to work with patient to improve mobility.  


-Continue Keflex 500 TID. 


-Continue Lovenox 40mg


-Possible discharge to TCU or [facility] for continued physical therapy training





Podiatry will continue to follow patient while inhouse

## 2017-05-18 ENCOUNTER — HOSPITAL ENCOUNTER (OUTPATIENT)
Dept: HOSPITAL 31 - C.OPSURG | Age: 57
Discharge: HOME | End: 2017-05-18
Attending: PODIATRIST
Payer: MEDICARE

## 2017-05-18 VITALS — BODY MASS INDEX: 26.2 KG/M2

## 2017-05-18 DIAGNOSIS — I10: ICD-10-CM

## 2017-05-18 DIAGNOSIS — E11.65: ICD-10-CM
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
## 2017-05-18 DIAGNOSIS — T81.30XA: Primary | ICD-10-CM

## 2017-05-18 DIAGNOSIS — Z79.4: ICD-10-CM

## 2017-05-18 PROCEDURE — 36415 COLL VENOUS BLD VENIPUNCTURE: CPT

## 2017-05-18 PROCEDURE — 13160 SEC CLSR SURG WND/DEHSN XTN: CPT

## 2017-05-18 PROCEDURE — 83036 HEMOGLOBIN GLYCOSYLATED A1C: CPT

## 2017-05-19 NOTE — PCM.SURG1
Surgeon's Initial Post Op Note





- Surgeon's Notes


Surgeon: Dr. Jay


Assistant: Dr. Roman


Type of Anesthesia: IV Sedation, Local


Anesthesia Administered By: CRNA


Pre-Operative Diagnosis: Left foot wound dehiscence secondary to uncontrolled 

blood sugar


Operative Findings: 3-4 Prolene


Post-Operative Diagnosis: same


Operation Performed: Delayed secondary closure of Left foot wound


Specimen/Specimens Removed: none


Estimated Blood Loss: EBL {In ML}: 5


Blood Products Given: N/A


Drains Used: No Drains


Post-Op Condition: Good


Date of Surgery/Procedure: 05/18/17


Time of Surgery/Procedure: 08:20

## 2017-05-22 NOTE — OP
PROCEDURE DATE: 05/18/2017



SURGEON:  Dr. Jay.



ASSISTANT:  Dr. Alejo Roman.



ANESTHETIST:  CRNA.



ANESTHESIA:  General mask and local.



PREOPERATIVE DIAGNOSIS:  Left foot wound dehiscence secondary to uncontrolled blood sugar.



POSTOPERATIVE DIAGNOSIS:  Left foot wound dehiscence secondary to uncontrolled blood sugar.



PROCEDURE PERFORMED:  Left foot delayed secondary closure of wound dehiscence.



INDICATIONS:  The patient is a 56-year-old female with the above diagnosis.  The patient had a left f
oot talonavicular fusion a couple months ago, and the incision site has wound dehiscence due to uncon
trolled blood sugar.  The patient signed the consent after careful explanation of risks, benefits, co
mplication, and alternatives for surgical procedure.  No guarantees were given nor implied.  N.p.o. s
tatus was confirmed prior to taking patient to the OR.



PREPARATION:  The patient was brought to the operating room table and placed on the operating room ta
ble in a supine position.  The well-padded pneumatic ankle tourniquet was placed to the patient's lef
t ankle in a supramalleolar position.  After induction of general anesthesia, the patient received a 
total of 12 mL of 1:1 mixture of 0.5% Marcaine plain and 1% lidocaine plain in local block fashion to
 the left foot.  Once local anesthesia was achieved, the left foot was then prepped and draped in usu
al sterile manner.  Esmarch was utilized to exsanguinate the patient's left foot.  Pneumatic ankle to
urniquet was then inflated to 250 mmHg and procedure began.



DESCRIPTION OF PROCEDURE:  Attention was directed to the dorsal aspect of the left foot longitudinal 
open wound over the talonavicular joint, measuring approximately 4 cm in length.  Utilizing #15 blade
 and a forceps, macerated wound margins were cut out with thickness of approximately 2 mm.  Wound bas
e was gently debrided with a #15 blade to promote bleeding and granulation of tissue.  The wound site
 was irrigated with copious amounts of mixture of normal saline and antibiotic.  Then, the skin was r
eapproximated with 4-0 Prolene.  Then, the left foot was dressed with Xeroform, 4 x 4, Kerlix, and Ac
e bandage.  The attending was present during the entire case.



POSTOPERATIVE CONDITION:  The patient tolerated the anesthesia and procedure well and was escorted to
 the recovery room with vital signs stable and neurovascular status intact to the left foot.  The pat
ient will follow up with Dr. Jay.





__________________________________________

ALEJO ROMAN DPM



__________________________________________

Jose Maria Jay DPM







cc:



DD: 05/21/2017 21:53:29  [id number]

TT: 05/21/2017 22:35:54

Job # [id number]

tn

## 2018-07-17 ENCOUNTER — HOSPITAL ENCOUNTER (EMERGENCY)
Dept: HOSPITAL 31 - C.ER | Age: 58
Discharge: HOME | End: 2018-07-17
Payer: MEDICARE

## 2018-07-17 VITALS — OXYGEN SATURATION: 96 %

## 2018-07-17 VITALS
DIASTOLIC BLOOD PRESSURE: 53 MMHG | RESPIRATION RATE: 18 BRPM | HEART RATE: 84 BPM | TEMPERATURE: 98 F | SYSTOLIC BLOOD PRESSURE: 120 MMHG

## 2018-07-17 VITALS — BODY MASS INDEX: 26.2 KG/M2

## 2018-07-17 DIAGNOSIS — J45.909: Primary | ICD-10-CM

## 2018-07-17 DIAGNOSIS — I10: ICD-10-CM

## 2018-07-17 DIAGNOSIS — R06.00: ICD-10-CM

## 2018-07-17 DIAGNOSIS — R05: ICD-10-CM

## 2018-07-17 DIAGNOSIS — E78.00: ICD-10-CM

## 2018-07-17 LAB
ALBUMIN SERPL-MCNC: 4 G/DL (ref 3.5–5)
ALBUMIN/GLOB SERPL: 1 {RATIO} (ref 1–2.1)
ALT SERPL-CCNC: 23 U/L (ref 9–52)
AST SERPL-CCNC: 21 U/L (ref 14–36)
BASOPHILS # BLD AUTO: 0.1 K/UL (ref 0–0.2)
BASOPHILS NFR BLD: 0.6 % (ref 0–2)
BNP SERPL-MCNC: 44.1 PG/ML (ref 0–900)
BUN SERPL-MCNC: 24 MG/DL (ref 7–17)
CALCIUM SERPL-MCNC: 10 MG/DL (ref 8.6–10.4)
EOSINOPHIL # BLD AUTO: 1 K/UL (ref 0–0.7)
EOSINOPHIL NFR BLD: 9.5 % (ref 0–4)
ERYTHROCYTE [DISTWIDTH] IN BLOOD BY AUTOMATED COUNT: 14.9 % (ref 11.5–14.5)
GFR NON-AFRICAN AMERICAN: 57
HGB BLD-MCNC: 11.3 G/DL (ref 11–16)
LYMPHOCYTES # BLD AUTO: 3.2 K/UL (ref 1–4.3)
LYMPHOCYTES NFR BLD AUTO: 29.1 % (ref 20–40)
MCH RBC QN AUTO: 26.8 PG (ref 27–31)
MCHC RBC AUTO-ENTMCNC: 33.2 G/DL (ref 33–37)
MCV RBC AUTO: 80.6 FL (ref 81–99)
MONOCYTES # BLD: 0.9 K/UL (ref 0–0.8)
MONOCYTES NFR BLD: 8.7 % (ref 0–10)
NEUTROPHILS # BLD: 5.7 K/UL (ref 1.8–7)
NEUTROPHILS NFR BLD AUTO: 52.1 % (ref 50–75)
NRBC BLD AUTO-RTO: 0 % (ref 0–2)
PLATELET # BLD: 157 K/UL (ref 130–400)
PMV BLD AUTO: 11.5 FL (ref 7.2–11.7)
RBC # BLD AUTO: 4.2 MIL/UL (ref 3.8–5.2)
WBC # BLD AUTO: 10.9 K/UL (ref 4.8–10.8)

## 2018-07-17 RX ADMIN — IPRATROPIUM BROMIDE AND ALBUTEROL SULFATE SCH ML: .5; 3 SOLUTION RESPIRATORY (INHALATION) at 09:12

## 2018-07-17 RX ADMIN — IPRATROPIUM BROMIDE AND ALBUTEROL SULFATE SCH ML: .5; 3 SOLUTION RESPIRATORY (INHALATION) at 09:11

## 2018-07-17 NOTE — RAD
Date of service: 



07/17/2018



HISTORY:

SOB  



COMPARISON:

08/24/2015.



TECHNIQUE:

Chest PA and lateral



FINDINGS:



LUNGS:

No active pulmonary disease.



PLEURA:

No significant pleural effusion identified. No pneumothorax apparent.



CARDIOVASCULAR:

 No radiographic findings to suggest acute or significant 

cardiovascular disease.



OSSEOUS STRUCTURES:

No significant abnormalities.



VISUALIZED UPPER ABDOMEN:

Normal.



OTHER FINDINGS:

None.



IMPRESSION:

No active disease. No significant interval change compared to the 

prior examination(s).

## 2018-07-17 NOTE — C.PDOC
History Of Present Illness


56 y/o female with history of Asthma presents to ED with c/o cough, congestion 

and sob for 1.5 months. Patient states she has seen PMD, given medication with 

no improvement prompting visit to ED today. Patient denies fever, chills, nausea

, vomiting, chest pain, dizziness, headache or any other complaints at this 

time. 


Time Seen by Provider: 07/17/18 08:02


Chief Complaint (Nursing): Shortness Of Breath


History Per: Patient


History/Exam Limitations: no limitations


Onset/Duration Of Symptoms: Days


Current Symptoms Are (Timing): Still Present


Initiating Event: Upper Respiratory Illness





Past Medical History


Reviewed: Historical Data, Nursing Documentation, Vital Signs


Vital Signs: 


 Last Vital Signs











Temp  98 F   07/17/18 10:33


 


Pulse  84   07/17/18 10:33


 


Resp  18   07/17/18 10:33


 


BP  120/53 L  07/17/18 10:33


 


Pulse Ox  100   07/17/18 10:33














- Medical History


PMH: Arthritis (KNEES), Asthma, HTN, Hypercholesterolemia


Surgical History: Endoscopy





- [facility] Procedures








CARPAL TUNNEL RELEASE (11/05/14)


COLONOSCOPY (06/18/15)


DEBRIDEMENT OF NAIL, NAIL BED OR NAIL FOLD (07/11/13)


DESTRUC-FOOT JT LES NEC (01/23/14)


OTHER LOCAL DESTRUC SKIN (07/11/13)


REPAIR OF HAMMER TOE (01/23/14)








Family History: States: No Known Family Hx





- Social History


Hx Alcohol Use: Yes


Hx Substance Use: No





- Immunization History


Hx Tetanus Toxoid Vaccination: No


Hx Influenza Vaccination: Yes


Hx Pneumococcal Vaccination: Yes





Review Of Systems


Constitutional: Negative for: Fever, Chills


ENT: Positive for: Nose Congestion


Respiratory: Positive for: Cough, Shortness of Breath


Gastrointestinal: Negative for: Nausea, Vomiting


Skin: Negative for: Rash


Neurological: Negative for: Headache





Physical Exam





- Physical Exam


Appears: Non-toxic, No Acute Distress


Skin: Warm, Dry, No Rash


Head: Atraumatic, Normacephalic


Eye(s): bilateral: Normal Inspection


Oral Mucosa: Moist


Neck: Supple


Chest: Symmetrical


Cardiovascular: Rhythm Regular


Respiratory: No Rales, Rhonchi (bibasilar), No Wheezing


Gastrointestinal/Abdominal: Soft, No Tenderness, No Guarding, No Rebound


Extremity: No Pedal Edema, Capillary Refill (<2 seconds)


Neurological/Psych: Oriented x3, Normal Speech, Normal Cognition





ED Course And Treatment





- Laboratory Results


Result Diagrams: 


 07/17/18 09:21





 07/17/18 09:21


O2 Sat by Pulse Oximetry: 96 (RA)


Pulse Ox Interpretation: Normal





- Radiology


CXR: Interpreted by Me


CXR Interpretation: Yes: No Acute Disease.  No: Infiltrates, Cardiomegaly





Medical Decision Making


Medical Decision Making: 





Patient coughing after nebulized treatment. Responded to Tesilon Pearls. Much 

improved. Will d/c with PMD f/u





Disposition


Counseled Patient/Family Regarding: Studies Performed, Diagnosis





- Disposition


Disposition: HOME/ ROUTINE


Disposition Time: 10:46


Condition: IMPROVED


Prescriptions: 


Benzonatate [Tessalon Perles] 100 mg PO TID #30 sgl


Instructions:  Asthma, Adult (DC)


Forms:  [facility] (English), General Discharge Instructions





- POA


Present On Arrival: None





- Clinical Impression


Clinical Impression: 


 Dyspnea, Asthma, Seasonal allergies, Cough








- Scribe Statement


The provider has reviewed the documentation as recorded by the Ginaibrebecca Gupta





All medical record entries made by the Ginaibrebecca were at my direction and 

personally dictated by me. I have reviewed the chart and agree that the record 

accurately reflects my personal performance of the history, physical exam, 

medical decision making, and the department course for this patient. I have 

also personally directed, reviewed, and agree with the discharge instructions 

and disposition.

## 2018-07-18 NOTE — CARD
--------------- APPROVED REPORT --------------





Date of service: 07/17/2018



EKG Measurement

Heart [id number]

NM [id number]

[id number]

[id number]

[id number]



<Conclusion>

Normal sinus rhythm

Normal ECG